# Patient Record
Sex: FEMALE | Race: WHITE | NOT HISPANIC OR LATINO | Employment: FULL TIME | URBAN - METROPOLITAN AREA
[De-identification: names, ages, dates, MRNs, and addresses within clinical notes are randomized per-mention and may not be internally consistent; named-entity substitution may affect disease eponyms.]

---

## 2023-12-04 ENCOUNTER — TELEMEDICINE (OUTPATIENT)
Dept: PRIMARY CARE | Facility: CLINIC | Age: 48
End: 2023-12-04
Payer: COMMERCIAL

## 2023-12-04 ENCOUNTER — TELEPHONE (OUTPATIENT)
Dept: PRIMARY CARE | Facility: CLINIC | Age: 48
End: 2023-12-04

## 2023-12-04 DIAGNOSIS — J40 BRONCHITIS: Primary | ICD-10-CM

## 2023-12-04 PROBLEM — R10.31 BILATERAL LOWER ABDOMINAL CRAMPING: Status: ACTIVE | Noted: 2023-12-04

## 2023-12-04 PROBLEM — F41.9 ANXIETY AND DEPRESSION: Status: ACTIVE | Noted: 2023-12-04

## 2023-12-04 PROBLEM — J01.00 ACUTE NON-RECURRENT MAXILLARY SINUSITIS: Status: ACTIVE | Noted: 2023-12-04

## 2023-12-04 PROBLEM — U07.1 COVID-19: Status: ACTIVE | Noted: 2023-12-04

## 2023-12-04 PROBLEM — M79.652 PAIN OF LEFT LATERAL UPPER THIGH: Status: ACTIVE | Noted: 2023-12-04

## 2023-12-04 PROBLEM — L98.9 CHANGING SKIN LESION: Status: ACTIVE | Noted: 2023-12-04

## 2023-12-04 PROBLEM — M76.30 ILIOTIBIAL BAND TENDONITIS: Status: ACTIVE | Noted: 2023-12-04

## 2023-12-04 PROBLEM — K12.0 CANKER SORE: Status: ACTIVE | Noted: 2023-12-04

## 2023-12-04 PROBLEM — D22.9 NUMEROUS SKIN MOLES: Status: ACTIVE | Noted: 2023-12-04

## 2023-12-04 PROBLEM — R73.9 HYPERGLYCEMIA: Status: ACTIVE | Noted: 2023-12-04

## 2023-12-04 PROBLEM — E78.1 HIGH TRIGLYCERIDES: Status: ACTIVE | Noted: 2023-12-04

## 2023-12-04 PROBLEM — R19.5 LOOSE STOOLS: Status: ACTIVE | Noted: 2023-12-04

## 2023-12-04 PROBLEM — M54.16 LUMBAR RADICULOPATHY: Status: ACTIVE | Noted: 2023-12-04

## 2023-12-04 PROBLEM — F32.A ANXIETY AND DEPRESSION: Status: ACTIVE | Noted: 2023-12-04

## 2023-12-04 PROBLEM — R10.32 BILATERAL LOWER ABDOMINAL CRAMPING: Status: ACTIVE | Noted: 2023-12-04

## 2023-12-04 PROBLEM — M79.18 MYOFASCIAL PAIN: Status: ACTIVE | Noted: 2023-12-04

## 2023-12-04 PROBLEM — G47.00 INSOMNIA: Status: ACTIVE | Noted: 2023-12-04

## 2023-12-04 PROBLEM — M47.816 LUMBAR SPONDYLOSIS: Status: ACTIVE | Noted: 2023-12-04

## 2023-12-04 PROBLEM — R05.9 COUGH: Status: ACTIVE | Noted: 2023-12-04

## 2023-12-04 PROBLEM — J30.9 ALLERGIC RHINITIS: Status: ACTIVE | Noted: 2023-12-04

## 2023-12-04 PROCEDURE — 99442 PR PHYS/QHP TELEPHONE EVALUATION 11-20 MIN: CPT | Performed by: STUDENT IN AN ORGANIZED HEALTH CARE EDUCATION/TRAINING PROGRAM

## 2023-12-04 RX ORDER — TRANEXAMIC ACID 650 MG/1
TABLET ORAL
COMMUNITY

## 2023-12-04 RX ORDER — ALBUTEROL SULFATE 90 UG/1
2 AEROSOL, METERED RESPIRATORY (INHALATION) EVERY 4 HOURS PRN
Qty: 6.7 G | Refills: 0 | Status: SHIPPED | OUTPATIENT
Start: 2023-12-04 | End: 2024-12-03

## 2023-12-04 RX ORDER — CITALOPRAM 20 MG/1
1 TABLET, FILM COATED ORAL DAILY
COMMUNITY
Start: 2023-02-20 | End: 2024-02-02

## 2023-12-04 RX ORDER — BENZONATATE 200 MG/1
200 CAPSULE ORAL 3 TIMES DAILY PRN
Qty: 30 CAPSULE | Refills: 0 | Status: SHIPPED | OUTPATIENT
Start: 2023-12-04 | End: 2023-12-14

## 2023-12-04 RX ORDER — ACETAMINOPHEN 500 MG
2 TABLET ORAL DAILY
COMMUNITY

## 2023-12-04 RX ORDER — LORATADINE 10 MG/1
1 CAPSULE, LIQUID FILLED ORAL DAILY
COMMUNITY

## 2023-12-04 RX ORDER — AZITHROMYCIN 250 MG/1
TABLET, FILM COATED ORAL
Qty: 6 TABLET | Refills: 0 | Status: SHIPPED | OUTPATIENT
Start: 2023-12-04 | End: 2023-12-09

## 2023-12-04 ASSESSMENT — ENCOUNTER SYMPTOMS
VOMITING: 0
POLYDIPSIA: 0
DIARRHEA: 0
COUGH: 1
DYSURIA: 0
HEADACHES: 0
FREQUENCY: 0
NAUSEA: 0
SORE THROAT: 1
EYE DISCHARGE: 0
WHEEZING: 1
HALLUCINATIONS: 0
FATIGUE: 0
EYE PAIN: 0
APPETITE CHANGE: 0
ABDOMINAL PAIN: 0
CONSTIPATION: 0
FEVER: 0
MYALGIAS: 0
HEMATURIA: 0
SHORTNESS OF BREATH: 0
PALPITATIONS: 0

## 2023-12-04 ASSESSMENT — COPD QUESTIONNAIRES: COPD: 0

## 2023-12-04 NOTE — PROGRESS NOTES
Subjective   Patient ID: Albina Celestin is a 48 y.o. female who presents for Cough, Sinusitis, Sore Throat, Chills (101.8 F), and Wheezing.    Cough  This is a new problem. The current episode started yesterday. The problem has been gradually worsening. The problem occurs constantly. The cough is Productive of sputum. Associated symptoms include a sore throat and wheezing. Pertinent negatives include no chest pain, ear pain, fever, headaches, myalgias, rash or shortness of breath. Treatments tried: nsaid. The treatment provided moderate relief. There is no history of asthma, COPD (albuterol) or emphysema.   Sinusitis  Associated symptoms include coughing and a sore throat. Pertinent negatives include no congestion, ear pain, headaches or shortness of breath.   Sore Throat   Associated symptoms include coughing. Pertinent negatives include no abdominal pain, congestion, diarrhea, ear discharge, ear pain, headaches, shortness of breath or vomiting.   Wheezing   Associated symptoms include coughing and a sore throat. Pertinent negatives include no abdominal pain, chest pain, diarrhea, ear pain, fever, headaches, rash, shortness of breath or vomiting. There is no history of asthma or COPD (albuterol).     Teacher at school.    Review of Systems   Constitutional:  Negative for appetite change, fatigue and fever.   HENT:  Positive for sore throat. Negative for congestion, ear discharge, ear pain and hearing loss.    Eyes:  Negative for pain and discharge.   Respiratory:  Positive for cough and wheezing. Negative for shortness of breath.    Cardiovascular:  Negative for chest pain, palpitations and leg swelling.   Gastrointestinal:  Negative for abdominal pain, constipation, diarrhea, nausea and vomiting.   Endocrine: Negative for cold intolerance, heat intolerance and polydipsia.   Genitourinary:  Negative for dysuria, frequency and hematuria.   Musculoskeletal:  Negative for gait problem and myalgias.   Skin:  Negative  for rash.   Neurological:  Negative for syncope and headaches.   Psychiatric/Behavioral:  Negative for hallucinations and suicidal ideas.        Objective   There were no vitals taken for this visit.    Physical Exam  Neurological:      Mental Status: She is alert.   Psychiatric:         Mood and Affect: Mood normal.         Assessment/Plan   Empirically start the patient on antibiotics as well as cough suppressant and inhaler.  If not better by next week return for reevaluation for proper physical exam.

## 2024-02-02 DIAGNOSIS — F32.A ANXIETY AND DEPRESSION: ICD-10-CM

## 2024-02-02 DIAGNOSIS — F41.9 ANXIETY AND DEPRESSION: ICD-10-CM

## 2024-02-02 RX ORDER — CITALOPRAM 20 MG/1
20 TABLET, FILM COATED ORAL DAILY
Qty: 90 TABLET | Refills: 1 | Status: SHIPPED | OUTPATIENT
Start: 2024-02-02 | End: 2024-04-24 | Stop reason: ALTCHOICE

## 2024-04-03 ENCOUNTER — OFFICE VISIT (OUTPATIENT)
Dept: PRIMARY CARE | Facility: CLINIC | Age: 49
End: 2024-04-03
Payer: COMMERCIAL

## 2024-04-03 VITALS
BODY MASS INDEX: 33.99 KG/M2 | DIASTOLIC BLOOD PRESSURE: 82 MMHG | OXYGEN SATURATION: 97 % | WEIGHT: 198 LBS | HEART RATE: 83 BPM | SYSTOLIC BLOOD PRESSURE: 122 MMHG | TEMPERATURE: 98.1 F

## 2024-04-03 DIAGNOSIS — J34.89 PURULENT NASAL DISCHARGE: ICD-10-CM

## 2024-04-03 DIAGNOSIS — J01.00 ACUTE NON-RECURRENT MAXILLARY SINUSITIS: Primary | ICD-10-CM

## 2024-04-03 DIAGNOSIS — R50.9 FEVER, UNSPECIFIED FEVER CAUSE: ICD-10-CM

## 2024-04-03 PROCEDURE — 1036F TOBACCO NON-USER: CPT | Performed by: INTERNAL MEDICINE

## 2024-04-03 PROCEDURE — 99213 OFFICE O/P EST LOW 20 MIN: CPT | Performed by: INTERNAL MEDICINE

## 2024-04-03 RX ORDER — DOXYCYCLINE 100 MG/1
100 CAPSULE ORAL 2 TIMES DAILY
Qty: 20 CAPSULE | Refills: 0 | Status: SHIPPED | OUTPATIENT
Start: 2024-04-03 | End: 2024-04-13

## 2024-04-03 RX ORDER — BENZONATATE 200 MG/1
200 CAPSULE ORAL 3 TIMES DAILY PRN
Qty: 30 CAPSULE | Refills: 0 | Status: SHIPPED | OUTPATIENT
Start: 2024-04-03 | End: 2024-04-13

## 2024-04-03 RX ORDER — AZITHROMYCIN 250 MG/1
TABLET, FILM COATED ORAL
Qty: 6 TABLET | Refills: 0 | Status: SHIPPED | OUTPATIENT
Start: 2024-04-03 | End: 2024-04-03

## 2024-04-03 NOTE — PROGRESS NOTES
Patient is a office today with chief complaint of cough congestion and purulent nasal discharge for more than 10 days.  She has gone to the urgent care and had test done for COVID which was negative.  Patient was taking symptomatic medications without relief now she has purulent nasal discharge also had fever up to 100.  She is afebrile today.  She has no shortness of breath or wheezing.  Review of other systems are negative.    On examination:  General examination: No acute discomfort  Eyes: There is no pallor or jaundice  Nose: Some purulent nasal discharge is present  Oral cavity: Postnasal discharge is present  Lungs: Clear to auscultation    Assessment and plan  1.  Acute sinusitis: Doxycycline is prescribed.  It was noted that azithromycin has some interaction with Celexa.  2.  Cough: Tessalon Perle is being prescribed  3.  Fever: She is advised to take acetaminophen as needed  She is advised to follow-up with Dr. Marley if symptoms are not resolved after completing the course of antibiotic

## 2024-04-24 ENCOUNTER — OFFICE VISIT (OUTPATIENT)
Dept: PRIMARY CARE | Facility: CLINIC | Age: 49
End: 2024-04-24
Payer: COMMERCIAL

## 2024-04-24 VITALS
WEIGHT: 197 LBS | DIASTOLIC BLOOD PRESSURE: 73 MMHG | TEMPERATURE: 98 F | HEIGHT: 64 IN | BODY MASS INDEX: 33.63 KG/M2 | SYSTOLIC BLOOD PRESSURE: 117 MMHG | HEART RATE: 96 BPM

## 2024-04-24 DIAGNOSIS — Z13.29 THYROID DISORDER SCREENING: ICD-10-CM

## 2024-04-24 DIAGNOSIS — Z00.00 HEALTH MAINTENANCE EXAMINATION: ICD-10-CM

## 2024-04-24 DIAGNOSIS — E78.1 HIGH TRIGLYCERIDES: Primary | ICD-10-CM

## 2024-04-24 DIAGNOSIS — J30.9 ALLERGIC RHINITIS, UNSPECIFIED SEASONALITY, UNSPECIFIED TRIGGER: ICD-10-CM

## 2024-04-24 DIAGNOSIS — R21 RASH: ICD-10-CM

## 2024-04-24 DIAGNOSIS — Z12.11 COLON CANCER SCREENING: ICD-10-CM

## 2024-04-24 DIAGNOSIS — L23.9 ALLERGIC CONTACT DERMATITIS OF LOWER LEG: ICD-10-CM

## 2024-04-24 DIAGNOSIS — R73.9 HYPERGLYCEMIA: ICD-10-CM

## 2024-04-24 DIAGNOSIS — E61.1 IRON DEFICIENCY: ICD-10-CM

## 2024-04-24 PROCEDURE — 99396 PREV VISIT EST AGE 40-64: CPT | Performed by: INTERNAL MEDICINE

## 2024-04-24 RX ORDER — TRIAMCINOLONE ACETONIDE 1 MG/G
CREAM TOPICAL 2 TIMES DAILY
Qty: 30 G | Refills: 0 | Status: SHIPPED | OUTPATIENT
Start: 2024-04-24 | End: 2024-05-08

## 2024-04-24 RX ORDER — FLUTICASONE PROPIONATE 50 MCG
2 SPRAY, SUSPENSION (ML) NASAL DAILY
Qty: 16 G | Refills: 11 | Status: SHIPPED | OUTPATIENT
Start: 2024-04-24 | End: 2025-04-24

## 2024-04-24 ASSESSMENT — ACTIVITIES OF DAILY LIVING (ADL)
TAKING_MEDICATION: INDEPENDENT
GROCERY_SHOPPING: INDEPENDENT
DOING_HOUSEWORK: INDEPENDENT
MANAGING_FINANCES: INDEPENDENT
DRESSING: INDEPENDENT
BATHING: INDEPENDENT

## 2024-04-24 ASSESSMENT — PATIENT HEALTH QUESTIONNAIRE - PHQ9
1. LITTLE INTEREST OR PLEASURE IN DOING THINGS: NOT AT ALL
SUM OF ALL RESPONSES TO PHQ9 QUESTIONS 1 AND 2: 0
2. FEELING DOWN, DEPRESSED OR HOPELESS: NOT AT ALL

## 2024-04-24 ASSESSMENT — ENCOUNTER SYMPTOMS
LOSS OF SENSATION IN FEET: 0
DEPRESSION: 0
OCCASIONAL FEELINGS OF UNSTEADINESS: 0

## 2024-04-24 ASSESSMENT — COLUMBIA-SUICIDE SEVERITY RATING SCALE - C-SSRS
6. HAVE YOU EVER DONE ANYTHING, STARTED TO DO ANYTHING, OR PREPARED TO DO ANYTHING TO END YOUR LIFE?: NO
1. IN THE PAST MONTH, HAVE YOU WISHED YOU WERE DEAD OR WISHED YOU COULD GO TO SLEEP AND NOT WAKE UP?: NO
2. HAVE YOU ACTUALLY HAD ANY THOUGHTS OF KILLING YOURSELF?: NO

## 2024-04-24 NOTE — PROGRESS NOTES
Subjective   Albina Celestin is a 48 y.o. female and is here for a comprehensive physical exam. The patient reports problems - sinusitis .    Do you take any herbs or supplements that were not prescribed by a doctor? yes  Are you taking calcium supplements? no  Are you taking aspirin daily? no      Do you have pain that bothers you in your daily life? not asked    The patient complains of a rash on the right lower leg after mowing outside with itching and excoriation. She is not taking Celexa and is going to pursue behavior therapy at [facility].           Review of Systems     Objective   Physical Exam    Assessment/Plan   Healthy female exam.      1.   2. Patient Counseling:  --Nutrition: Stressed importance of moderation in sodium/caffeine intake, saturated fat and cholesterol, caloric balance, sufficient intake of fresh fruits, vegetables, fiber, calcium, iron, and 1 mg of folate supplement per day (for females capable of pregnancy).  --Discussed the issue of estrogen replacement, calcium supplement, and the daily use of baby aspirin.  --Exercise: Stressed the importance of regular exercise.   --Substance Abuse: Discussed cessation/primary prevention of tobacco, alcohol, or other drug use; driving or other dangerous activities under the influence; availability of treatment for abuse.    --Sexuality: Discussed sexually transmitted diseases, partner selection, use of condoms, avoidance of unintended pregnancy  and contraceptive alternatives.   --Injury prevention: Discussed safety belts, safety helmets, smoke detector, smoking near bedding or upholstery.   --Dental health: Discussed importance of regular tooth brushing, flossing, and dental visits.  --Immunizations reviewed.  --Discussed benefits of screening colonoscopy.  --After hours service discussed with patient  3. Discussed the patient's BMI with her.  The BMI is above average. The patient received Provided instructions on dietary changes because  they have an above normal BMI.  4. Follow up in one year

## 2024-05-10 ENCOUNTER — APPOINTMENT (OUTPATIENT)
Dept: PRIMARY CARE | Facility: CLINIC | Age: 49
End: 2024-05-10
Payer: COMMERCIAL

## 2024-07-30 LAB
NON-UH HIE A/G RATIO: 1
NON-UH HIE ALB: 3.6 G/DL (ref 3.4–5)
NON-UH HIE ALK PHOS: 96 UNIT/L (ref 45–117)
NON-UH HIE BASO COUNT: 0.05 X1000 (ref 0–0.2)
NON-UH HIE BASOS %: 0.6 %
NON-UH HIE BILIRUBIN, TOTAL: 0.3 MG/DL (ref 0.3–1.2)
NON-UH HIE BUN/CREAT RATIO: 17.8
NON-UH HIE BUN: 16 MG/DL (ref 9–23)
NON-UH HIE CALCIUM: 9.2 MG/DL (ref 8.7–10.4)
NON-UH HIE CALCULATED LDL CHOLESTEROL: 136 MG/DL (ref 60–130)
NON-UH HIE CALCULATED OSMOLALITY: 281 MOSM/KG (ref 275–295)
NON-UH HIE CHLORIDE: 108 MMOL/L (ref 98–107)
NON-UH HIE CHOLESTEROL: 202 MG/DL (ref 100–200)
NON-UH HIE CO2, VENOUS: 24 MMOL/L (ref 20–31)
NON-UH HIE CREATININE: 0.9 MG/DL (ref 0.5–0.8)
NON-UH HIE DIFF?: NO
NON-UH HIE EOS COUNT: 0.07 X1000 (ref 0–0.5)
NON-UH HIE EOSIN %: 0.8 %
NON-UH HIE FERRITIN: 10 NG/ML (ref 10–291)
NON-UH HIE GFR AA: >60
NON-UH HIE GLOBULIN: 3.6 G/DL
NON-UH HIE GLOMERULAR FILTRATION RATE: >60 ML/MIN/1.73M?
NON-UH HIE GLUCOSE: 107 MG/DL (ref 74–106)
NON-UH HIE GOT: 16 UNIT/L (ref 15–37)
NON-UH HIE GPT: 28 UNIT/L (ref 10–49)
NON-UH HIE HCT: 42 % (ref 36–46)
NON-UH HIE HDL CHOLESTEROL: 42 MG/DL (ref 40–60)
NON-UH HIE HGB A1C: 5.4 %
NON-UH HIE HGB: 13.7 G/DL (ref 12–16)
NON-UH HIE INSTR WBC: 8.4
NON-UH HIE IRON: 55 UG/DL (ref 50–170)
NON-UH HIE K: 4.4 MMOL/L (ref 3.5–5.1)
NON-UH HIE LYMPH %: 28.2 %
NON-UH HIE LYMPH COUNT: 2.36 X1000 (ref 1.2–4.8)
NON-UH HIE MCH: 27.9 PG (ref 27–34)
NON-UH HIE MCHC: 32.6 G/DL (ref 32–37)
NON-UH HIE MCV: 85.7 FL (ref 80–100)
NON-UH HIE MONO %: 5.1 %
NON-UH HIE MONO COUNT: 0.43 X1000 (ref 0.1–1)
NON-UH HIE MPV: 9 FL (ref 7.4–10.4)
NON-UH HIE NA: 140 MMOL/L (ref 135–145)
NON-UH HIE NEUTROPHIL %: 65.2 %
NON-UH HIE NEUTROPHIL COUNT (ANC): 5.46 X1000 (ref 1.4–8.8)
NON-UH HIE NUCLEATED RBC: 0 /100WBC
NON-UH HIE PLATELET: 314 X10 (ref 150–450)
NON-UH HIE RBC: 4.9 X10 (ref 4.2–5.4)
NON-UH HIE RDW: 14.6 % (ref 11.5–14.5)
NON-UH HIE SATURATION: 12.6 % (ref 20–50)
NON-UH HIE TIBC: 437 UG/ML (ref 250–425)
NON-UH HIE TOTAL CHOL/HDL CHOL RATIO: 4.8
NON-UH HIE TOTAL PROTEIN: 7.2 G/DL (ref 5.7–8.2)
NON-UH HIE TRIGLYCERIDES: 121 MG/DL (ref 30–150)
NON-UH HIE TSH: 1.91 UIU/ML (ref 0.55–4.78)
NON-UH HIE WBC: 8.4 X10 (ref 4.5–11)

## 2024-07-31 ENCOUNTER — TELEPHONE (OUTPATIENT)
Dept: PRIMARY CARE | Facility: CLINIC | Age: 49
End: 2024-07-31
Payer: COMMERCIAL

## 2024-07-31 NOTE — RESULT ENCOUNTER NOTE
Patient's most recent blood work showed Mild elevation of her fasting sugar but normal average sugar level.  Normal liver enzymes and electrolytes.  Patient's bad cholesterol was elevated at 136 total cholesterol was 202 which was also slightly elevated.  Increase fiber in the diet and avoid fatty foods.  Patient's iron saturation and iron storage levels were low.  She does not have anemia though and platelet count,  white blood cell count are normal.  Normal thyroid function.  She should make sure she is up-to-date on colon cancer screening since she does have low iron levels.  She may benefit from over-the-counter iron supplement.

## 2024-07-31 NOTE — TELEPHONE ENCOUNTER
----- Message from Salomón Marley sent at 7/31/2024 10:26 AM EDT -----  Patient's most recent blood work showed Mild elevation of her fasting sugar but normal average sugar level.  Normal liver enzymes and electrolytes.  Patient's bad cholesterol was elevated at 136 total cholesterol was 202 which was also slightly eleva  chelsey.  Increase fiber in the diet and avoid fatty foods.  Patient's iron saturation and iron storage levels were low.  She does not have anemia though and platelet count,  white blood cell count are normal.  Normal thyroid function.  She should make s  ure she is up-to-date on colon cancer screening since she does have low iron levels.  She may benefit from over-the-counter iron supplement.

## 2025-07-01 ENCOUNTER — OFFICE VISIT (OUTPATIENT)
Dept: PRIMARY CARE | Facility: CLINIC | Age: 50
End: 2025-07-01
Payer: COMMERCIAL

## 2025-07-01 VITALS
HEIGHT: 63 IN | DIASTOLIC BLOOD PRESSURE: 83 MMHG | BODY MASS INDEX: 33.35 KG/M2 | HEART RATE: 84 BPM | WEIGHT: 188.2 LBS | SYSTOLIC BLOOD PRESSURE: 130 MMHG | TEMPERATURE: 97.7 F

## 2025-07-01 DIAGNOSIS — J02.9 SORE THROAT: Primary | ICD-10-CM

## 2025-07-01 DIAGNOSIS — Z00.00 HEALTH MAINTENANCE EXAMINATION: ICD-10-CM

## 2025-07-01 DIAGNOSIS — Z13.1 DIABETES MELLITUS SCREENING: ICD-10-CM

## 2025-07-01 DIAGNOSIS — Z13.29 THYROID DISORDER SCREENING: ICD-10-CM

## 2025-07-01 DIAGNOSIS — Z12.11 COLON CANCER SCREENING: ICD-10-CM

## 2025-07-01 DIAGNOSIS — E61.1 IRON DEFICIENCY: ICD-10-CM

## 2025-07-01 DIAGNOSIS — E78.1 HIGH TRIGLYCERIDES: ICD-10-CM

## 2025-07-01 DIAGNOSIS — R73.9 HYPERGLYCEMIA: ICD-10-CM

## 2025-07-01 DIAGNOSIS — H65.192 ACUTE MUCOID OTITIS MEDIA OF LEFT EAR: ICD-10-CM

## 2025-07-01 DIAGNOSIS — E55.9 VITAMIN D DEFICIENCY: ICD-10-CM

## 2025-07-01 LAB
POC RAPID STREP: NEGATIVE
POC SARS-COV-2 AG BINAX: NORMAL

## 2025-07-01 PROCEDURE — 87880 STREP A ASSAY W/OPTIC: CPT | Performed by: INTERNAL MEDICINE

## 2025-07-01 PROCEDURE — 99213 OFFICE O/P EST LOW 20 MIN: CPT | Performed by: INTERNAL MEDICINE

## 2025-07-01 PROCEDURE — 1036F TOBACCO NON-USER: CPT | Performed by: INTERNAL MEDICINE

## 2025-07-01 PROCEDURE — 3008F BODY MASS INDEX DOCD: CPT | Performed by: INTERNAL MEDICINE

## 2025-07-01 PROCEDURE — 87811 SARS-COV-2 COVID19 W/OPTIC: CPT | Performed by: INTERNAL MEDICINE

## 2025-07-01 RX ORDER — LAMOTRIGINE 100 MG/1
1 TABLET ORAL
COMMUNITY
Start: 2025-03-13

## 2025-07-01 RX ORDER — CEFDINIR 300 MG/1
300 CAPSULE ORAL 2 TIMES DAILY
Qty: 20 CAPSULE | Refills: 0 | Status: SHIPPED | OUTPATIENT
Start: 2025-07-01 | End: 2025-07-11

## 2025-07-01 RX ORDER — QUETIAPINE FUMARATE 25 MG/1
25 TABLET, FILM COATED ORAL NIGHTLY
COMMUNITY
Start: 2025-04-18

## 2025-07-01 ASSESSMENT — ENCOUNTER SYMPTOMS
LIGHT-HEADEDNESS: 0
DIARRHEA: 0
RHINORRHEA: 0
DYSURIA: 0
SORE THROAT: 1
CONFUSION: 0
FEVER: 0
DIZZINESS: 0
AGITATION: 0
FACIAL SWELLING: 0
SINUS PAIN: 0
VOMITING: 0
BLOOD IN STOOL: 0
COUGH: 0
CHILLS: 0
HEMATURIA: 0
PALPITATIONS: 0
ABDOMINAL PAIN: 0
SHORTNESS OF BREATH: 0
NAUSEA: 0

## 2025-07-01 ASSESSMENT — PATIENT HEALTH QUESTIONNAIRE - PHQ9
SUM OF ALL RESPONSES TO PHQ9 QUESTIONS 1 AND 2: 0
1. LITTLE INTEREST OR PLEASURE IN DOING THINGS: NOT AT ALL
2. FEELING DOWN, DEPRESSED OR HOPELESS: NOT AT ALL

## 2025-07-01 NOTE — PROGRESS NOTES
Subjective   Patient ID: Albina Celestin is a 49 y.o. female who presents for Sore Throat (X 1 week.  More irritated on left side. ).  History of Present Illness  Albina Celestin is a 49 year old female who presents with a sore throat persisting for over a week.    She has been experiencing a sore throat for over a week, with pain particularly severe on the left side when swallowing. She has been taking Advil or Tylenol regularly to manage the pain. No fever, cough, shortness of breath, chest pain, or nasal congestion. Her sinuses are sensitive, and she tends to sleep on her left side.  She did have COVID-19 testing and strep testing in the office today with a rapid strep and point-of-care COVID-19 testing both of which were negative.  Complaining of some left ear discomfort as well on exam.    She has a history of being prone to sinus infections and notes that the recent hot weather and air conditioning might be contributing factors. She takes Claritin 10 mg daily as part of her regular regimen for allergies.    Her  recently suffered a fall, fracturing his tibia, fibula, and three vertebrae, which has been a source of stress for her. She works as a  at [employer], which is close to her home, and has been balancing work with her 's medical needs.    She is currently on Lamictal 200 mg daily and Seroquel for an undiagnosed mood disorder, which has been more effective than her previous medication, Lexapro. She has experienced mood swings in the past but reports improvement with the current regimen.  Patient is following with psychiatry for management of this.    She has a history of heavy periods but notes that they have become less frequent over the past year. She is perimenopausal and has been taking over-the-counter tests to confirm this. She previously used tranexamic acid for heavy periods but has not needed it recently.    She takes vitamin D for seasonal depression and  "has a history of low iron levels, which was noted in her last blood work. She has not been taking iron supplements.    Review of Systems   Constitutional:  Negative for chills and fever.   HENT:  Positive for sore throat. Negative for congestion, facial swelling, rhinorrhea and sinus pain.    Eyes:  Negative for visual disturbance.   Respiratory:  Negative for cough and shortness of breath.    Cardiovascular:  Negative for chest pain, palpitations and leg swelling.   Gastrointestinal:  Negative for abdominal pain, blood in stool, diarrhea, nausea and vomiting.   Genitourinary:  Negative for dysuria and hematuria.   Skin:  Negative for rash.   Neurological:  Negative for dizziness, syncope and light-headedness.   Psychiatric/Behavioral:  Negative for agitation and confusion.        Objective     /83 (BP Location: Left arm, Patient Position: Sitting, BP Cuff Size: Adult)   Pulse 84   Temp 36.5 °C (97.7 °F)   Ht 1.588 m (5' 2.5\")   Wt 85.4 kg (188 lb 3.2 oz)   BMI 33.87 kg/m²      Physical Exam  Vitals and nursing note reviewed.   Constitutional:       General: She is not in acute distress.     Appearance: Normal appearance. She is obese. She is not ill-appearing, toxic-appearing or diaphoretic.   HENT:      Head: Normocephalic and atraumatic.      Ears:      Comments: Left ear with some erythema of the tympanic membrane     Nose: No rhinorrhea.      Mouth/Throat:      Mouth: Mucous membranes are moist.      Pharynx: Oropharynx is clear. No oropharyngeal exudate or posterior oropharyngeal erythema.   Eyes:      Extraocular Movements: Extraocular movements intact.      Pupils: Pupils are equal, round, and reactive to light.   Cardiovascular:      Rate and Rhythm: Normal rate and regular rhythm.      Heart sounds: Normal heart sounds. No murmur heard.  Pulmonary:      Effort: Pulmonary effort is normal. No respiratory distress.      Breath sounds: Normal breath sounds. No wheezing, rhonchi or rales. "   Abdominal:      General: There is no distension.      Palpations: Abdomen is soft. There is no mass.      Tenderness: There is no abdominal tenderness. There is no guarding.   Musculoskeletal:      Cervical back: Neck supple. No rigidity or tenderness.      Right lower leg: No edema.      Left lower leg: No edema.   Lymphadenopathy:      Cervical: No cervical adenopathy.   Skin:     General: Skin is warm and dry.      Coloration: Skin is not jaundiced or pale.      Findings: No rash.   Neurological:      General: No focal deficit present.      Mental Status: She is alert and oriented to person, place, and time. Mental status is at baseline.      Cranial Nerves: No cranial nerve deficit.   Psychiatric:         Mood and Affect: Mood normal.         Behavior: Behavior normal.         Thought Content: Thought content normal.         Judgment: Judgment normal.        Physical Exam      Assessment & Plan  Sore throat  Experiencing a sore throat for over a week, primarily on the left side, with pain upon swallowing. No fever, cough, or other systemic symptoms. Strep and COVID-19 tests negative. Sore throat may be related to sinus drainage, as she reports lying on her left side, which could cause drainage to irritate the throat. Left ear slightly red, indicating a possible early ear infection. Cefdinir chosen due to intolerance to Augmentin and amoxicillin's potential ineffectiveness for this condition.  - Prescribe cefdinir 300 mg twice a day for 10 days for possible left ear infection and acute pharyngitis  - Advise use of saline nasal spray and gargling with salt water to alleviate throat irritation.  Suspect there is some postnasal drainage is contributing to the symptoms as well.    Perimenopausal symptoms  Perimenopausal with irregular periods. Using over-the-counter tests to confirm perimenopausal status. Previously experienced heavy periods but not taking tranexamic acid recently as periods have become less  frequent.    Mood disorder, unspecified  Being treated for an unspecified mood disorder with Lamictal and Seroquel. Current medication regimen is effective, with fewer mood swings compared to previous treatments. Actively tracking mood and medication effects.    Low iron levels  Low iron levels, possibly indicating anemia. Not taking iron supplements as previously recommended. No celiac disease or other gastrointestinal issues that could explain low iron levels.  - Order blood work to re-evaluate iron levels    General Health Maintenance  Due for a mammogram and colon cancer screening. Mammogram scheduled for next Wednesday. No previous colonoscopy or Cologuard test. No family history of colon cancer.  - Order Cologuard test for colon cancer screening  - Confirm mammogram appointment for next Wednesday    Results  LABS  Strep test: negative (07/01/2025)  COVID-19 test: negative (07/01/2025)    Problem List Items Addressed This Visit       High triglycerides    Relevant Orders    Lipid Panel    Hyperglycemia    Relevant Orders    Hemoglobin A1C    Sore throat - Primary    Relevant Medications    cefdinir (Omnicef) 300 mg capsule    Other Relevant Orders    POCT Rapid Strep A manually resulted (Completed)    POCT BinaxNOW Covid-19 Ag Card manually resulted (Completed)    Colon cancer screening    Relevant Orders    Cologuard® colon cancer screening    Acute mucoid otitis media of left ear    Relevant Medications    cefdinir (Omnicef) 300 mg capsule    Iron deficiency    Relevant Orders    CBC    Iron and TIBC    Ferritin    Thyroid disorder screening    Relevant Orders    TSH with reflex to Free T4 if abnormal    Diabetes mellitus screening    Relevant Orders    Hemoglobin A1C    Vitamin D deficiency    Relevant Orders    Vitamin D 25-Hydroxy,Total (for eval of Vitamin D levels)     Other Visit Diagnoses         Health maintenance examination        Relevant Orders    Comprehensive Metabolic Panel    CBC               Salomón Marley DO     This medical note was created with the assistance of artificial intelligence (AI) for documentation purposes. The content has been reviewed and confirmed by the healthcare provider for accuracy and completeness. Patient consented to the use of audio recording and use of AI during their visit.

## 2025-08-15 ENCOUNTER — APPOINTMENT (OUTPATIENT)
Dept: PRIMARY CARE | Facility: CLINIC | Age: 50
End: 2025-08-15
Payer: COMMERCIAL